# Patient Record
Sex: FEMALE | Race: WHITE | Employment: STUDENT | URBAN - METROPOLITAN AREA
[De-identification: names, ages, dates, MRNs, and addresses within clinical notes are randomized per-mention and may not be internally consistent; named-entity substitution may affect disease eponyms.]

---
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
## 2024-03-07 DIAGNOSIS — Z30.09 COUNSELING FOR BIRTH CONTROL, ORAL CONTRACEPTIVES: ICD-10-CM

## 2024-03-07 NOTE — TELEPHONE ENCOUNTER
Gynecology Medication Protocol Aspcjq1503/07/2024 12:35 AM    PASS-PENDING LAST PAP WNL--VIA MANUAL LOOKUP    Physical or Pelvic/Breast in past 12 or next 3 mos--VIA MANUAL LOOKUP   Routing to provider per protocol.   Levonorgestrel-Ethinyl Estrad 0.1-20 MG-MCG Oral Tab   Last refilled on 12/26/23 for #84  with 3 rf.   Last labs 12/26/23.   Last seen on 12/26/23.     No future appointments.       Thank you.     
Fair

## 2024-03-08 RX ORDER — LEVONORGESTREL AND ETHINYL ESTRADIOL 0.1-0.02MG
1 KIT ORAL DAILY
Qty: 84 TABLET | Refills: 3 | Status: SHIPPED | OUTPATIENT
Start: 2024-03-08

## 2024-06-22 DIAGNOSIS — F41.9 ANXIETY: ICD-10-CM

## 2024-06-24 NOTE — TELEPHONE ENCOUNTER
No refill protocol for this medication.    Last refill: 12/26/2023 #90 with 3 refills  Last Visit: 12/26/2023   Next Visit: No future appointments.      Forward to GERARDO Solares please advise on refills. Thanks.

## 2024-06-25 ENCOUNTER — PATIENT MESSAGE (OUTPATIENT)
Dept: FAMILY MEDICINE CLINIC | Facility: CLINIC | Age: 19
End: 2024-06-25

## 2024-06-25 NOTE — TELEPHONE ENCOUNTER
HISTORY OF PRESENT ILLNESS:  Jad Davila is a 32 year old male who comes in today complaining of nausea that started 3 days ago and need a work excuse.     Symptoms are improving.  Associated symptoms include: abdominal pain, nausea, and diarrhea. Denies severe ABD (abdominal) pain, hematemesis, melena, and hematochezia. Voiding usual amount. Denies dark urine. For symptom relief Jad Davila has tried over-the-counter TUMs, Pepto bismol, OTC headache pills. Family/Social History: There has not been any recent exposure to individuals with similar symptoms. Reports ingestion of suspicious food/drink - [employer]. No recent foreign travel. He has been drinking.     No outpatient medications have been marked as taking for the 12/18/19 encounter (Walk In) with Adeline Hale NP.     ALLERGIES:  No Known Allergies     reports that he quit smoking about 5 years ago. His smoking use included cigarettes. He smoked 1.00 pack per day. He has quit using smokeless tobacco.     REVIEW OF SYSTEMS:  A three point review of systems was performed.  All other systems were reviewed and are negative unless stated above.    PHYSICAL EXAMINATION:  Visit Vitals  /76   Pulse 87   Temp 98.6 °F (37 °C)   Resp 16   SpO2 97%   General:  Healthy, alert, in no distress, cooperative.  Neck: Supple. Cervical lymph nodes: no enlargement  Eyes:  Conjunctivae/sclerae normal. No erythema, edema or exudate.  Nose:  nares patent  Mouth/Throat: Mucous membranes pink and moist.   Heart:  regular rate and rhythm and normal S1 and S2  Lungs: Respirations unlabored.  Chest expansion equal. Lungs clear to ausculation.  No rhonchi, wheezing, or rales.   Skin: Good turgor. No rashes.   GI: soft, nontender, without masses or organomegally with normal active bowel sounds    ASSESSMENT/PLAN:  1. Acute gastroenteritis  Symptom Management Recommended: Frequent, sips clear fluids - electrolyte drink like Gatorade or Pedialyte  Spoke with patient, advised 1 year supply sent on 12/26/23. Patient verbally understood     advised. Advance diet as tolerated. BRAT (Banana/Rice/Apple/Tea) diet. Tylenol as directed on package for fever, pain and body aches. Present to UC/ED (Urgent Care/Emergency Department) if any signs of dehydration.     Patient education materials given. Present to ED if symptoms worsen or high fever. Patient to follow up with primary care provider as needed.     Jad verbalizes understanding and is in agreement and has no additional concerns/questions at this time.     Adeline Hale, NP

## 2024-06-25 NOTE — TELEPHONE ENCOUNTER
From: Kelsie Watson  To: Luciana Cervantes  Sent: 6/25/2024 4:42 PM CDT  Subject: Zoloft     Hi, my request was denied for a refill of Sertraline and I’m not sure why. If you could provide an explanation that would be greatly appreciated.     Thank you,  Kelsie

## 2024-07-02 ENCOUNTER — TELEPHONE (OUTPATIENT)
Dept: FAMILY MEDICINE CLINIC | Facility: CLINIC | Age: 19
End: 2024-07-02

## 2024-07-02 NOTE — TELEPHONE ENCOUNTER
PT HAS SCHEDULED MYCHART APT FOR:    Trouble breathing     Future Appointments   Date Time Provider Department Center   7/2/2024  1:40 PM Luciana Cervantes APRN [facility] [place]     PLEASE TRIAGE     THANK YOU

## 2024-07-02 NOTE — TELEPHONE ENCOUNTER
Patient states that she has been having cough and difficutly taking a deep breath since Sunday.    She states she took a home COVID test and it is negative.    Patient denies fever, sore throat.    Patient is coming in this afternoon for visit    Future Appointments   Date Time Provider Department Center   7/2/2024  1:40 PM Luciana Cervantes APRN [facility] [place]

## 2024-07-03 ENCOUNTER — OFFICE VISIT (OUTPATIENT)
Dept: FAMILY MEDICINE CLINIC | Facility: CLINIC | Age: 19
End: 2024-07-03
Payer: COMMERCIAL

## 2024-07-03 VITALS
HEART RATE: 98 BPM | WEIGHT: 170 LBS | TEMPERATURE: 98 F | DIASTOLIC BLOOD PRESSURE: 70 MMHG | RESPIRATION RATE: 16 BRPM | OXYGEN SATURATION: 98 % | SYSTOLIC BLOOD PRESSURE: 100 MMHG | BODY MASS INDEX: 27 KG/M2

## 2024-07-03 DIAGNOSIS — R00.2 INTERMITTENT PALPITATIONS: Primary | ICD-10-CM

## 2024-07-03 DIAGNOSIS — R05.1 ACUTE COUGH: ICD-10-CM

## 2024-07-03 DIAGNOSIS — R09.89 CHEST CONGESTION: ICD-10-CM

## 2024-07-03 LAB
ATRIAL RATE: 65 BPM
P AXIS: 39 DEGREES
P-R INTERVAL: 134 MS
Q-T INTERVAL: 378 MS
QRS DURATION: 88 MS
QTC CALCULATION (BEZET): 393 MS
R AXIS: 34 DEGREES
T AXIS: 41 DEGREES
VENTRICULAR RATE: 65 BPM

## 2024-07-03 PROCEDURE — 99214 OFFICE O/P EST MOD 30 MIN: CPT | Performed by: NURSE PRACTITIONER

## 2024-07-03 PROCEDURE — 93000 ELECTROCARDIOGRAM COMPLETE: CPT | Performed by: NURSE PRACTITIONER

## 2024-07-03 PROCEDURE — 3078F DIAST BP <80 MM HG: CPT | Performed by: NURSE PRACTITIONER

## 2024-07-03 PROCEDURE — 3074F SYST BP LT 130 MM HG: CPT | Performed by: NURSE PRACTITIONER

## 2024-07-03 RX ORDER — ALBUTEROL SULFATE 90 UG/1
1-2 AEROSOL, METERED RESPIRATORY (INHALATION) EVERY 4 HOURS PRN
Qty: 1 EACH | Refills: 0 | Status: SHIPPED | OUTPATIENT
Start: 2024-07-03 | End: 2024-07-17

## 2024-07-03 RX ORDER — AZITHROMYCIN 250 MG/1
TABLET, FILM COATED ORAL
Qty: 6 TABLET | Refills: 0 | Status: SHIPPED | OUTPATIENT
Start: 2024-07-03 | End: 2024-07-07

## 2024-07-03 NOTE — PROGRESS NOTES
CHIEF COMPLAINT:    Chief Complaint   Patient presents with    Breathing Problem       HISTORY OF PRESENT ILLNESS:    Kelsie who has a history of SVT presents today, July 03, 2024, for 4 day history of \"difficulty breathing,\" chest tightness, productive cough, scratchy sore throat, and chest pain to middle of chest.  Made worse with coughing.  Made better with use of dayquil.  Positive for intermittent palpitations, lasting about 30 seconds or less.  Denies fever, night sweats, near fainting, wheezing, or chills.    ALLERGIES:  No Known Allergies    CURRENT MEDICATIONS:  Current Outpatient Medications   Medication Sig Dispense Refill    azithromycin 250 MG Oral Tab Take 2 tablets (500 mg total) by mouth daily for 1 day, THEN 1 tablet (250 mg total) daily for 4 days. 6 tablet 0    albuterol 108 (90 Base) MCG/ACT Inhalation Aero Soln Inhale 1-2 puffs into the lungs every 4 (four) hours as needed for Wheezing or Shortness of Breath. 1 each 0    Levonorgestrel-Ethinyl Estrad 0.1-20 MG-MCG Oral Tab Take 1 tablet by mouth daily. Due for physical before next refill 84 tablet 3    sertraline (ZOLOFT) 50 MG Oral Tab Take 1 tablet (50 mg total) by mouth daily. 90 tablet 3    metoclopramide 10 MG Oral Tab 1 tab with the first dose of migraine medication 20 tablet 0    Naratriptan HCl 2.5 MG Oral Tab Use at onset; may repeat once after 4 hours- ONLY 2 IN 24 HOUR PERIOD MAX. This is a 30 day supply. 8 tablet 5       MEDICAL HISTORY:  Past Medical History:    Arrhythmia    SVT - had ablation 2010    Hypothyroidism    SVT (supraventricular tachycardia) (HCC)     Past Surgical History:   Procedure Laterality Date    Ablation      Other surgical history  8/18/10    ablation for SVT     Family History   Problem Relation Age of Onset    Migraines Father     Other (Positive for Brca 2) Mother     Cancer Maternal Grandmother         breast    Prostate Cancer Maternal Grandfather     Heart Disease Neg     Stroke Neg      Family Status    Relation Status    Fa Alive    Mo Alive    PGMA Alive    PGFA Alive    MGMA Alive    MGFA Alive    NEG (Not Specified)     Social History     Socioeconomic History    Marital status: Single   Tobacco Use    Smoking status: Never     Passive exposure: Never   Vaping Use    Vaping status: Never Used   Substance and Sexual Activity    Alcohol use: No    Drug use: No   Other Topics Concern    Caffeine Concern Yes     Comment: a few sodas a week    Exercise Yes     Comment: walks       ROS:  GENERAL:  Denies recorded temperatures greater than 100.5F  RESPIRATORY:  +hpi  CARDIAC:  Denies chest pain with exertion    VITALS:   /70   Pulse 98   Temp 97.5 °F (36.4 °C)   Resp 16   Wt 170 lb (77.1 kg)   LMP 12/19/2023 (Approximate)   SpO2 98%   BMI 27.44 kg/m²     Reviewed by Luciana Cervantes MS, APRN, FNP-BC    PHYSICAL EXAM:    Constitutional:       Appears well.  Sitting upright on exam table.  Well developed, well nourished, and in no acute distress  HEENT:      Facial features symmetric. Normocephalic and atraumatic  Cardiovascular:      Heart sounds: Regular rate and rhythm without murmur     No edema of BLE  Pulmonary:      Chest expansion symmetric.  Breathing nonlabored. Lungs coarse to upper lobe throughout     No cough.  Musculoskeletal:         Movements smooth and controlled with appropriate coordination.       Gait is steady, nonantalgic.  Neuro:       No focal deficits, cranial nerves grossly intact.       Movements smooth and controlled, appropriate coordination without ataxia or tremors.  Skin:     Warm and dry without jaundice or rashes.  Psychiatric:         Alert and oriented.  Calm and cooperative.  Speech is clear.     ASSESSMENT & PLAN:    1. Intermittent palpitations  Reinforced hydration  - EKG with interpretation and Report -IN OFFICE [21822]    2. Chest congestion  - azithromycin 250 MG Oral Tab; Take 2 tablets (500 mg total) by mouth daily for 1 day, THEN 1 tablet (250 mg total) daily  for 4 days.  Dispense: 6 tablet; Refill: 0  - albuterol 108 (90 Base) MCG/ACT Inhalation Aero Soln; Inhale 1-2 puffs into the lungs every 4 (four) hours as needed for Wheezing or Shortness of Breath.  Dispense: 1 each; Refill: 0    3. Acute cough  - albuterol 108 (90 Base) MCG/ACT Inhalation Aero Soln; Inhale 1-2 puffs into the lungs every 4 (four) hours as needed for Wheezing or Shortness of Breath.  Dispense: 1 each; Refill: 0    Follow-up 1 week if failure to improve  Earlier if new signs or symptoms

## 2024-12-20 ENCOUNTER — OFFICE VISIT (OUTPATIENT)
Dept: FAMILY MEDICINE CLINIC | Facility: CLINIC | Age: 19
End: 2024-12-20
Payer: COMMERCIAL

## 2024-12-20 ENCOUNTER — HOSPITAL ENCOUNTER (OUTPATIENT)
Dept: GENERAL RADIOLOGY | Age: 19
Discharge: HOME | End: 2024-12-20
Attending: NURSE PRACTITIONER
Payer: COMMERCIAL

## 2024-12-20 VITALS
BODY MASS INDEX: 27.97 KG/M2 | HEART RATE: 82 BPM | HEIGHT: 66 IN | RESPIRATION RATE: 16 BRPM | DIASTOLIC BLOOD PRESSURE: 68 MMHG | TEMPERATURE: 98 F | OXYGEN SATURATION: 99 % | WEIGHT: 174 LBS | SYSTOLIC BLOOD PRESSURE: 100 MMHG

## 2024-12-20 DIAGNOSIS — F41.9 ANXIETY: ICD-10-CM

## 2024-12-20 DIAGNOSIS — G89.29 CHRONIC MIDLINE LOW BACK PAIN WITHOUT SCIATICA: ICD-10-CM

## 2024-12-20 DIAGNOSIS — M54.50 CHRONIC MIDLINE LOW BACK PAIN WITHOUT SCIATICA: ICD-10-CM

## 2024-12-20 DIAGNOSIS — M76.62 ACHILLES TENDINITIS OF BOTH LOWER EXTREMITIES: ICD-10-CM

## 2024-12-20 DIAGNOSIS — I47.10 SVT (SUPRAVENTRICULAR TACHYCARDIA) (HCC): ICD-10-CM

## 2024-12-20 DIAGNOSIS — R20.2 PARESTHESIA: ICD-10-CM

## 2024-12-20 DIAGNOSIS — G43.009 MIGRAINE WITHOUT AURA AND WITHOUT STATUS MIGRAINOSUS, NOT INTRACTABLE: ICD-10-CM

## 2024-12-20 DIAGNOSIS — M76.61 ACHILLES TENDINITIS OF BOTH LOWER EXTREMITIES: ICD-10-CM

## 2024-12-20 DIAGNOSIS — Z00.00 ANNUAL PHYSICAL EXAM: Primary | ICD-10-CM

## 2024-12-20 LAB
ALBUMIN SERPL-MCNC: 4.5 G/DL (ref 3.2–4.8)
ALBUMIN/GLOB SERPL: 1.6 {RATIO} (ref 1–2)
ALP LIVER SERPL-CCNC: 88 U/L
ALT SERPL-CCNC: 15 U/L
ANION GAP SERPL CALC-SCNC: 11 MMOL/L (ref 0–18)
AST SERPL-CCNC: 19 U/L (ref ?–34)
BASOPHILS # BLD AUTO: 0.04 X10(3) UL (ref 0–0.2)
BASOPHILS NFR BLD AUTO: 0.6 %
BILIRUB SERPL-MCNC: 0.4 MG/DL (ref 0.3–1.2)
BUN BLD-MCNC: 13 MG/DL (ref 9–23)
CALCIUM BLD-MCNC: 10 MG/DL (ref 8.7–10.4)
CHLORIDE SERPL-SCNC: 106 MMOL/L (ref 98–112)
CHOLEST SERPL-MCNC: 193 MG/DL (ref ?–200)
CO2 SERPL-SCNC: 25 MMOL/L (ref 21–32)
CREAT BLD-MCNC: 0.95 MG/DL
DEPRECATED HBV CORE AB SER IA-ACNC: 11 NG/ML
EGFRCR SERPLBLD CKD-EPI 2021: 88 ML/MIN/1.73M2 (ref 60–?)
EOSINOPHIL # BLD AUTO: 0.09 X10(3) UL (ref 0–0.7)
EOSINOPHIL NFR BLD AUTO: 1.4 %
ERYTHROCYTE [DISTWIDTH] IN BLOOD BY AUTOMATED COUNT: 12.3 %
FASTING PATIENT LIPID ANSWER: NO
FASTING STATUS PATIENT QL REPORTED: NO
GLOBULIN PLAS-MCNC: 2.8 G/DL (ref 2–3.5)
GLUCOSE BLD-MCNC: 85 MG/DL (ref 70–99)
HCT VFR BLD AUTO: 44.3 %
HDLC SERPL-MCNC: 58 MG/DL (ref 40–59)
HGB BLD-MCNC: 15.4 G/DL
IMM GRANULOCYTES # BLD AUTO: 0.01 X10(3) UL (ref 0–1)
IMM GRANULOCYTES NFR BLD: 0.2 %
IRON SATN MFR SERPL: 20 %
IRON SERPL-MCNC: 78 UG/DL
LDLC SERPL CALC-MCNC: 121 MG/DL (ref ?–100)
LYMPHOCYTES # BLD AUTO: 1.95 X10(3) UL (ref 1–4)
LYMPHOCYTES NFR BLD AUTO: 30.6 %
MCH RBC QN AUTO: 30.3 PG (ref 26–34)
MCHC RBC AUTO-ENTMCNC: 34.8 G/DL (ref 31–37)
MCV RBC AUTO: 87.2 FL
MONOCYTES # BLD AUTO: 0.48 X10(3) UL (ref 0.1–1)
MONOCYTES NFR BLD AUTO: 7.5 %
NEUTROPHILS # BLD AUTO: 3.8 X10 (3) UL (ref 1.5–7.7)
NEUTROPHILS # BLD AUTO: 3.8 X10(3) UL (ref 1.5–7.7)
NEUTROPHILS NFR BLD AUTO: 59.7 %
NONHDLC SERPL-MCNC: 135 MG/DL (ref ?–130)
OSMOLALITY SERPL CALC.SUM OF ELEC: 293 MOSM/KG (ref 275–295)
PLATELET # BLD AUTO: 291 10(3)UL (ref 150–450)
POTASSIUM SERPL-SCNC: 4.1 MMOL/L (ref 3.5–5.1)
PROT SERPL-MCNC: 7.3 G/DL (ref 5.7–8.2)
RBC # BLD AUTO: 5.08 X10(6)UL
SODIUM SERPL-SCNC: 142 MMOL/L (ref 136–145)
T4 FREE SERPL-MCNC: 1.2 NG/DL (ref 0.8–1.7)
TOTAL IRON BINDING CAPACITY: 385 UG/DL (ref 250–425)
TRANSFERRIN SERPL-MCNC: 307 MG/DL (ref 250–380)
TRIGL SERPL-MCNC: 77 MG/DL (ref 30–149)
TSI SER-ACNC: 7.16 UIU/ML (ref 0.55–4.78)
VIT B12 SERPL-MCNC: 465 PG/ML (ref 211–911)
VLDLC SERPL CALC-MCNC: 13 MG/DL (ref 0–30)
WBC # BLD AUTO: 6.4 X10(3) UL (ref 4–11)

## 2024-12-20 PROCEDURE — 80061 LIPID PANEL: CPT | Performed by: NURSE PRACTITIONER

## 2024-12-20 PROCEDURE — 72110 X-RAY EXAM L-2 SPINE 4/>VWS: CPT | Performed by: NURSE PRACTITIONER

## 2024-12-20 PROCEDURE — 99395 PREV VISIT EST AGE 18-39: CPT | Performed by: NURSE PRACTITIONER

## 2024-12-20 PROCEDURE — 83540 ASSAY OF IRON: CPT | Performed by: NURSE PRACTITIONER

## 2024-12-20 PROCEDURE — 3008F BODY MASS INDEX DOCD: CPT | Performed by: NURSE PRACTITIONER

## 2024-12-20 PROCEDURE — 84443 ASSAY THYROID STIM HORMONE: CPT | Performed by: NURSE PRACTITIONER

## 2024-12-20 PROCEDURE — 3074F SYST BP LT 130 MM HG: CPT | Performed by: NURSE PRACTITIONER

## 2024-12-20 PROCEDURE — 83550 IRON BINDING TEST: CPT | Performed by: NURSE PRACTITIONER

## 2024-12-20 PROCEDURE — 84439 ASSAY OF FREE THYROXINE: CPT | Performed by: NURSE PRACTITIONER

## 2024-12-20 PROCEDURE — 3078F DIAST BP <80 MM HG: CPT | Performed by: NURSE PRACTITIONER

## 2024-12-20 PROCEDURE — 82728 ASSAY OF FERRITIN: CPT | Performed by: NURSE PRACTITIONER

## 2024-12-20 PROCEDURE — 85025 COMPLETE CBC W/AUTO DIFF WBC: CPT | Performed by: NURSE PRACTITIONER

## 2024-12-20 PROCEDURE — 80053 COMPREHEN METABOLIC PANEL: CPT | Performed by: NURSE PRACTITIONER

## 2024-12-20 PROCEDURE — 82607 VITAMIN B-12: CPT | Performed by: NURSE PRACTITIONER

## 2024-12-20 NOTE — PROGRESS NOTES
CHIEF COMPLAINT:    Chief Complaint   Patient presents with    Physical     Reviewed Preventative/Wellness form with patient.         HISTORY OF PRESENT ILLNESS:    Kelsie Watson is a 19 year old female who has a history of SVT, anxiety, and achilles tendinitis of BLE presents today, December 20, 2024, for an annual physical and to address two concerns:  Migraines and lower back pain    - NUTRITION:  Follows a regular diet.  Drinks approximately 64oz of water a day.   - SLEEP:  Sleeps throughout the night.  Wakes up feeling rested.    - SAFETY:  Reports feeling safe at home.  Anxiety borderline, stopped taking sertraline, last dose October 2024.   - PHYSICAL ACTIVITY/SOCIAL/HOBBIES:  Achieves 2.5hr of exercise a week.  Denies chest pain or palpitations with activity.   - GOAL:  Kelsie would like to improve health overall by addressing two recurring concerns:  migraines and lower back pain    IMMUNIZATIONS:  Declines today    Migraines  Began about a year and a half ago  Has previously seen neurology team, who diagnosed with ocular migraines  Experiences numbness/tingling on one side of face as well as bilateral hands  Pain is described as sharp and annoying, then throbbing  Last episode about 1 month ago  Lasting about 8-12 hours  Managing with ibuprofen and adequate hydration, dark room helps  Positive for photophobia and bilateral eye pain, eye pain typically occurring after migraine  Denies nausea or vomiting  Last eye exam over a year ago, agreeable to complete eye exam while home/back from [facility]  Previously taking naratriptan   Last neuro exam with Dr. Glez, requesting referral today    SVT s/p RF ablation on 09/19/2010  Concealed left posteroseptal AP  Previously under the care of Dr. Yves Peguero who considered event monitor if symptoms recur per notes from 07/13/2022  Kelsie reports having a \"hole in heart\" and would like to follow with cardiology for follow-up  Has appointment set up with  Dr. Velazquez  Requesting referral     Back pain  Lower, midline  Made worse with laying supine on floor \"takes the wind out of me\"  Began in high school  Lower back pain has worsened in the past month  Denies difficulty going up and down stairs or pain with running  Denies leg weakness, numbness/tingling of BLE, or difficulty with urination/bowel movements  Denies muscle spasms or previous injury  We discussed possibility of PT, injections, and/or referral depending on results of xray    Patient Active Problem List   Diagnosis    SVT (supraventricular tachycardia) (HCC)    Achilles tendinitis of both lower extremities    Anxiety    Migraine without aura and without status migrainosus, not intractable    Chronic midline low back pain without sciatica        ALLERGIES:  Allergies[1]    CURRENT MEDICATIONS:  Current Outpatient Medications   Medication Sig Dispense Refill    Levonorgestrel-Ethinyl Estrad 0.1-20 MG-MCG Oral Tab Take 1 tablet by mouth daily. Due for physical before next refill 84 tablet 3    sertraline (ZOLOFT) 50 MG Oral Tab Take 1 tablet (50 mg total) by mouth daily. 90 tablet 3    metoclopramide 10 MG Oral Tab 1 tab with the first dose of migraine medication 20 tablet 0    Naratriptan HCl 2.5 MG Oral Tab Use at onset; may repeat once after 4 hours- ONLY 2 IN 24 HOUR PERIOD MAX. This is a 30 day supply. 8 tablet 5       MEDICAL HISTORY:  Past Medical History:    Anxiety    Arrhythmia    SVT - had ablation 2010    Calcaneal apophysitis    Hypothyroidism    SVT (supraventricular tachycardia) (HCC)     Past Surgical History:   Procedure Laterality Date    Ablation      Other surgical history  8/18/10    ablation for SVT     Family History   Problem Relation Age of Onset    Migraines Father     Other (Positive for Brca 2) Mother     Anemia Mother     Cancer Mother         Skin Cancer    Cancer Paternal Grandfather     Cancer Maternal Grandmother         Breast Cancer    Prostate Cancer Maternal Grandfather      Asthma Brother     Depression Brother     Heart Disease Neg     Stroke Neg      Family Status   Relation Status    Fa Alive    Mo Alive    PGMA Alive    PGFA Alive    MGMA Alive    MGFA Alive    Bro (Not Specified)    Bro (Not Specified)    NEG (Not Specified)     Social History     Socioeconomic History    Marital status: Single   Tobacco Use    Smoking status: Never     Passive exposure: Never    Smokeless tobacco: Never   Vaping Use    Vaping status: Never Used   Substance and Sexual Activity    Alcohol use: No    Drug use: No   Other Topics Concern    Caffeine Concern No    Stress Concern Yes    Weight Concern No    Special Diet No    Exercise No    Seat Belt No       ROS:    GENERAL:  Denies fever   RESPIRATORY:  Denies difficulty breathing  CARDIAC:  Denies chest pain with exertion  GI:  Denies nausea, vomiting, diarrhea, constipation, or blood in stool  :  Denies blood in urine or painful urination  NEURO:  +migraines  MSKL:  +lower back pain  SKIN:  Denies rashes  PSYCH: Denies thoughts of self harm or harming others     VITALS:    /68   Pulse 82   Temp 97.6 °F (36.4 °C) (Temporal)   Resp 16   Ht 5' 6\" (1.676 m)   Wt 174 lb (78.9 kg)   LMP 12/17/2024 (Approximate)   SpO2 99%   BMI 28.08 kg/m²     Ideal body weight: 59.3 kg (130 lb 11.7 oz)  Adjusted ideal body weight: 67.1 kg (148 lb 0.6 oz)  Wt Readings from Last 3 Encounters:   12/20/24 174 lb (78.9 kg)   07/03/24 170 lb (77.1 kg)   12/26/23 165 lb (74.8 kg) (90%, Z= 1.27)*     * Growth percentiles are based on CDC (Girls, 2-20 Years) data.     BP Readings from Last 3 Encounters:   12/20/24 100/68   07/03/24 100/70   12/26/23 110/60     Reviewed by Luciana Cervantes MS, APRN, FNP-BC    PHYSICAL EXAM:    Constitutional:       Appearance: Normal appearance.  Sitting upright on exam table.  Well developed, well nourished, and in no acute distress.  HENT:      Head: Facial features symmetric. Normocephalic and atraumatic.      Right Ear:  Canal clear without erythema or drainage.  TM clear and intact, neutral in position.      Left Ear: Canal clear without erythema or drainage.  TM clear and intact, neutral in position.      Nose: Nose normal.      Mouth/Throat: Mucous membranes are moist.  Uvula rises midline.  Eyes:      Extraocular Movements: Extraocular movements intact.      Conjunctiva/sclera: Conjunctivae normal. Sclera anicteric         Pupils: Pupils are equal, round, and reactive to light.   Neck:     Neck is supple. Trachea is midline.  No masses.   Cardiovascular:      Heart sounds: Regular rate and rhythm without murmur.     BLE without edema.  Pulmonary:      Effort: Pulmonary effort is normal.      Breath sounds: Lungs clear throughout.     No cough or wheezing.  Abdominal:      General: Abdomen is nondistended, bowel sounds normoactive, soft, nontender.  No organomegaly.  Musculoskeletal:         General: Normal range of motion. Strength of extremities are equal bilaterally.     Range of motion without limitations.  Skin:     General: Skin is warm and dry.      Coloration: Skin is without jaundice     Findings: No bruising or rashes  Neurological:      General: No focal deficit present. Speech is clear and organized.     Mental Status: Alert and oriented to person, place, and time.      Sensory: Sensation is intact.      Motor: Motor function is intact. Movements are smooth and controlled without ataxia.     Coordination: Coordination is intact. Coordination normal.      Gait: Gait steady and nonantalgic.      Deep Tendon Reflexes: Reflexes 2+ bilaterally.     Cranial nerves II-XII intact  Psychiatric:         Mood and Affect: Mood normal.         Behavior: Behavior normal.         Thought Content: Thought content normal.         Judgment: Judgment normal.     ASSESSMENT & PLAN:  Plan on follow-up with neurology and cardiology   ER if signs/symptoms as discussed  Awaiting lab results to guide plan of care    1. Annual physical exam  +  migraines  + lower back pain  - CBC With Differential With Platelet; Future  - TSH and Free T4; Future  - Comp Metabolic Panel (14); Future  - Lipid Panel; Future  - Ferritin; Future  - Iron And Tibc; Future  - VENIPUNCTURE  - CBC With Differential With Platelet  - TSH and Free T4  - Comp Metabolic Panel (14)  - Lipid Panel  - Ferritin  - Iron And Tibc    2. SVT (supraventricular tachycardia) (HCC)  Migraines  Denies palpitations, chest pain, or activity intolerance  Referral to cardiology to establish care  Last appointment with pediatric cardiologist, has aged out  - CBC With Differential With Platelet; Future  - Comp Metabolic Panel (14); Future  - Ferritin; Future  - Iron And Tibc; Future  - Cardio Referral - Internal  - CBC With Differential With Platelet  - Comp Metabolic Panel (14)  - Ferritin  - Iron And Tibc    3. Achilles tendinitis of both lower extremities  Asymptomatic    4. Anxiety  Stable  Discussed various treatment options  Awaiting labs  Consider follow-up to discuss more in depth if labs normal  - CBC With Differential With Platelet; Future  - TSH and Free T4; Future  - Ferritin; Future  - Iron And Tibc; Future  - CBC With Differential With Platelet  - TSH and Free T4  - Ferritin  - Iron And Tibc    5. Migraine without aura and without status migrainosus, not intractable  Referral to neurology team  In mean time, assessing for possible iron/vitamin deficiency as well as thyroid disease, electrolyte disturbance  - CBC With Differential With Platelet; Future  - TSH and Free T4; Future  - Comp Metabolic Panel (14); Future  - Ferritin; Future  - Iron And Tibc; Future  - Vitamin B12 [E]; Future  - Neuro Referral - In Network  - Cardio Referral - Internal  - CBC With Differential With Platelet  - TSH and Free T4  - Comp Metabolic Panel (14)  - Ferritin  - Iron And Tibc  - Vitamin B12 [E]    6. Paresthesia  Referral to neurology team  In mean time, assessing for possible iron/vitamin deficiency as well  as thyroid disease, electrolyte disturbance  - Vitamin B12 [E]; Future  - Vitamin B12 [E]    7. Chronic midline low back pain without sciatica  Midline  Made worse with laying supine on floor \"takes the wind out of me\"  - XR LUMBAR SPINE (MIN 4 VIEWS) (CPT=72110); Future         [1] No Known Allergies

## 2024-12-24 ENCOUNTER — PATIENT MESSAGE (OUTPATIENT)
Dept: FAMILY MEDICINE CLINIC | Facility: CLINIC | Age: 19
End: 2024-12-24

## 2024-12-24 DIAGNOSIS — R79.89 ELEVATED TSH: Primary | ICD-10-CM

## 2025-01-07 RX ORDER — METOCLOPRAMIDE 10 MG/1
TABLET ORAL
Qty: 20 TABLET | Refills: 0 | Status: SHIPPED | OUTPATIENT
Start: 2025-01-07

## 2025-01-07 RX ORDER — NARATRIPTAN 2.5 MG/1
TABLET ORAL
Qty: 8 TABLET | Refills: 0 | Status: SHIPPED | OUTPATIENT
Start: 2025-01-07

## 2025-01-07 NOTE — TELEPHONE ENCOUNTER
Medication: Naratriptan 2.5 mg & Metoclopramide 10 mg     Date of last refill: 2023 with 5 addt refills  [place]      Last office visit: 2023  Due back to clinic per last office note:    Date next office visit scheduled:    Future Appointments   Date Time Provider Department Center   2025  3:15 PM [facility]           Last OV note recommendation:    PLAN:  Diagnostic:    Given that this is her first episode we are compelled to do advanced imaging with MRI of the brain as well as MRA of the carotid vessels.           Treatments:  Orders Placed This Encounter      metoclopramide 10 MG Oral Tab          Si tab with the first dose of migraine medication          Dispense:  20 tablet          Refill:  0      Naratriptan HCl 2.5 MG Oral Tab          Sig: Use at onset; may repeat once after 4 hours- ONLY 2 IN 24 HOUR PERIOD MAX. This is a 30 day supply.          Dispense:  8 tablet          Refill:  5

## 2025-01-08 ENCOUNTER — HOSPITAL ENCOUNTER (OUTPATIENT)
Dept: CV DIAGNOSTICS | Facility: HOSPITAL | Age: 20
Discharge: HOME | End: 2025-01-08
Attending: INTERNAL MEDICINE
Payer: COMMERCIAL

## 2025-01-08 DIAGNOSIS — I47.10 SVT (SUPRAVENTRICULAR TACHYCARDIA) (HCC): ICD-10-CM

## 2025-01-08 DIAGNOSIS — R00.2 PALPITATIONS: ICD-10-CM

## 2025-01-08 PROCEDURE — 93306 TTE W/DOPPLER COMPLETE: CPT | Performed by: INTERNAL MEDICINE

## 2025-02-25 ENCOUNTER — TELEPHONE (OUTPATIENT)
Dept: FAMILY MEDICINE CLINIC | Facility: CLINIC | Age: 20
End: 2025-02-25

## 2025-02-25 NOTE — TELEPHONE ENCOUNTER
Patient due for labs. [facility] sent to patient.   Future Appointments   Date Time Provider Department Center   3/12/2025  1:00 PM Harriett Rendon PA [facility] [place]

## 2025-03-12 ENCOUNTER — TELEPHONE (OUTPATIENT)
Dept: NEUROLOGY | Facility: CLINIC | Age: 20
End: 2025-03-12

## 2025-03-12 ENCOUNTER — OFFICE VISIT (OUTPATIENT)
Dept: NEUROLOGY | Facility: CLINIC | Age: 20
End: 2025-03-12
Payer: COMMERCIAL

## 2025-03-12 VITALS
RESPIRATION RATE: 16 BRPM | BODY MASS INDEX: 28.13 KG/M2 | WEIGHT: 175 LBS | HEIGHT: 66 IN | SYSTOLIC BLOOD PRESSURE: 107 MMHG | HEART RATE: 75 BPM | DIASTOLIC BLOOD PRESSURE: 59 MMHG

## 2025-03-12 DIAGNOSIS — G43.009 MIGRAINE WITHOUT AURA AND WITHOUT STATUS MIGRAINOSUS, NOT INTRACTABLE: Primary | ICD-10-CM

## 2025-03-12 PROCEDURE — 3008F BODY MASS INDEX DOCD: CPT | Performed by: PHYSICIAN ASSISTANT

## 2025-03-12 PROCEDURE — 3078F DIAST BP <80 MM HG: CPT | Performed by: PHYSICIAN ASSISTANT

## 2025-03-12 PROCEDURE — 3074F SYST BP LT 130 MM HG: CPT | Performed by: PHYSICIAN ASSISTANT

## 2025-03-12 PROCEDURE — 99213 OFFICE O/P EST LOW 20 MIN: CPT | Performed by: PHYSICIAN ASSISTANT

## 2025-03-12 RX ORDER — UBROGEPANT 100 MG/1
TABLET ORAL
Qty: 3 TABLET | Refills: 0 | Status: SHIPPED | COMMUNITY
Start: 2025-03-12 | End: 2026-03-12

## 2025-03-12 NOTE — PROGRESS NOTES
NEUROLOGY  [facility]       Previous visit and existing record notes reviewed in preparation for the face to face visit.  Relevant labs and studies reviewed and will be noted in relevant areas of this record.    Kelsie Hoang Walter  3/24/2005  Primary Care Provider:  Brian Conde DO    3/12/2025  19 year old female,      was last seen on: Patient was last seen 3/7/23 for first visit at that time with complaints of transient neurologic symptoms associated with migraine headache. At that time MRI Brain and MRA Neck were ordered and she was given amerge for abortive tx    Seen for/plans last visit:  Migraines    Accompanied visit: Yes- Mother    Present condition:  The migraines are currently occurring once every 2 months.   Patient states that her symptoms will start with a vision change. It can be blurry or black spot in her vision. When she notices the vision change is when she will take medication for the migraine. Then she will get numbness in her fingers and into her face, lips,cheeks and can feel it in her legs. Most of the time it is located on one side of the body. Once the paresthesias resolve then the headache will come.  The headache pain It is bitemporal and describes a sharp pain The headache can last 8-16 hours  There is associated: +photophobia, no phonophobia, no nausea, no vomiting  No neck pain  Father has migraines  Uses the naratriptan but it has not been effective  She has tried tylenol and advil which does not work  She is not aware of any triggers for the migraines  They can occur anytime of day    MRI Brain(3/8/23)        Impression   CONCLUSION:       1. No acute intracranial abnormality identified.     2. There are a few scattered punctate nonspecific foci of T2/FLAIR hyperintensity noted in the cerebral white matter. Possible etiologies for these findings would include migraine headaches, postinfectious/postinflammatory changes, nonspecific gliosis,  and accelerated  microvascular ischemic disease amongst various other etiologies.  Clinical correlation is recommended.        MRA Neck(3/8/23)    Impression   CONCLUSION:  No evidence of occlusion, dissection, or flow-limiting stenosis in the cervical vertebral or carotid arteries. No evidence of hemodynamically significant carotid stenosis by NASCET criteria.       Review of Systems:  Review of Systems:  Denies systemic symptoms     No CP or SOB.  No GI or  symptoms. Relevant Neuro as noted above.    Past Medical History:    Anxiety    Arrhythmia    SVT - had ablation 2010    Calcaneal apophysitis    Hypothyroidism    SVT (supraventricular tachycardia) ([facility])       Medications:      Current Outpatient Medications:     ubrogepant (UBRELVY) 100 MG Oral Tab, Take one tablet at onset of migraine.  May take additional tablet in 2 hours if needed.  Do not exceed two tablets per 24 hour period., Disp: 3 tablet, Rfl: 0    metoclopramide 10 MG Oral Tab, 1 tab with the first dose of migraine medication, Disp: 20 tablet, Rfl: 0    Naratriptan HCl 2.5 MG Oral Tab, Use at onset; may repeat once after 4 hours- ONLY 2 IN 24 HOUR PERIOD MAX. This is a 30 day supply., Disp: 8 tablet, Rfl: 0    Levonorgestrel-Ethinyl Estrad 0.1-20 MG-MCG Oral Tab, Take 1 tablet by mouth daily. Due for physical before next refill, Disp: 84 tablet, Rfl: 3    sertraline (ZOLOFT) 50 MG Oral Tab, Take 1 tablet (50 mg total) by mouth daily., Disp: 90 tablet, Rfl: 3  PRN:     Allergies:  Allergies[1]       EXAM:  /59 (BP Location: Left arm, Patient Position: Sitting, Cuff Size: adult)   Pulse 75   Resp 16   Ht 66\"   Wt 175 lb (79.4 kg)   LMP 03/09/2025 (Approximate)   BMI 28.25 kg/m²   Looks stated age  General Exam:  HENT:  pink conjunctiva anicteric sclerae  Neck no adenopathy, thyroid normal  Heart and Lungs:  normal  Extremities: no cyanosis, skin changes    NEURO  NAD, A&Ox3  EOMI  CN II-XII intact  Strength 5/5 all extremities  DTRs 2+ and  symmetric  FTN intact bilaterally  No drift on exam  Normal gait  Tandem gait intact  Romberg negative    Problem/s Identified this visit:   1. Migraine without aura and without status migrainosus, not intractable          Discussion plus Diagnostics & Treatment Orders:    Migraines- Has not found the amerge to be effective. Patient given samples of ubrelvy to try for abortive tx. If effective was instructed to call for a prescription of the ubrelvy. She expressed full understanding.    (X) Discussed potential side effects of any treatment relevant to above.  Includes explanation of tests as necessary.    Return in about 4 months (around 7/12/2025).      Patient understands that if needed, based on condition and or test results, follow up will be readjusted    Harriett Rendon PA-C  [facility]  3/12/2025, Time completed 1:13 PM           [1] No Known Allergies

## 2025-03-12 NOTE — PATIENT INSTRUCTIONS
Refill policies:    Allow 2-3 business days for refills; controlled substances may take longer.  Contact your pharmacy at least 5 days prior to running out of medication and have them send an electronic request or submit request through the “request refill” option in your [employer] account.  Refills are not addressed on weekends; covering physicians do not authorize routine medications on weekends.  No narcotics or controlled substances are refilled after noon on Fridays or by on call physicians.  By law, narcotics must be electronically prescribed.  A 30 day supply with no refills is the maximum allowed.  If your prescription is due for a refill, you may be due for a follow up appointment.  To best provide you care, patients receiving routine medications need to be seen at least once a year.  Patients receiving narcotic/controlled substance medications need to be seen at least once every 3 months.  In the event that your preferred pharmacy does not have the requested medication in stock (e.g. Backordered), it is your responsibility to find another pharmacy that has the requested medication available.  We will gladly send a new prescription to that pharmacy at your request.    Scheduling Tests:    If your physician has ordered radiology tests such as MRI or CT scans, please contact Central Scheduling at [phone] right away to schedule the test.  Once scheduled, the [facility] Centralized Referral Team will work with your insurance carrier to obtain pre-certification or prior authorization.  Depending on your insurance carrier, approval may take 3-10 days.  It is highly recommended patients assure they have received an authorization before having a test performed.  If test is done without insurance authorization, patient may be responsible for the entire amount billed.      Precertification and Prior Authorizations:  If your physician has recommended that you have a procedure or additional testing performed the [facility]  Centralized Referral Team will contact your insurance carrier to obtain pre-certification or prior authorization.    You are strongly encouraged to contact your insurance carrier to verify that your procedure/test has been approved and is a COVERED benefit.  Although the [facility] Centralized Referral Team does its due diligence, the insurance carrier gives the disclaimer that \"Although the procedure is authorized, this does not guarantee payment.\"    Ultimately the patient is responsible for payment.   Thank you for your understanding in this matter.  Paperwork Completion:  If you require FMLA or disability paperwork for your recovery, please make sure to either drop it off or have it faxed to our office at [phone]. Be sure the form has your name and date of birth on it.  The form will be faxed to our Forms Department and they will complete it for you.  There is a 25$ fee for all forms that need to be filled out.  Please be aware there is a 10-14 day turnaround time.  You will need to sign a release of information (DEJUAN) form if your paperwork does not come with one.  You may call the Forms Department with any questions at [phone].  Their fax number is [phone].

## 2025-03-13 NOTE — TELEPHONE ENCOUNTER
Received fax from [employer]   Approval received for patient use of Ubrelvy   Approval granted: 2/10/25-3/12/26        Pt notified

## 2025-03-14 ENCOUNTER — OFFICE VISIT (OUTPATIENT)
Dept: FAMILY MEDICINE CLINIC | Facility: CLINIC | Age: 20
End: 2025-03-14
Payer: COMMERCIAL

## 2025-03-14 VITALS
RESPIRATION RATE: 18 BRPM | TEMPERATURE: 98 F | WEIGHT: 171.63 LBS | HEIGHT: 66 IN | SYSTOLIC BLOOD PRESSURE: 105 MMHG | HEART RATE: 103 BPM | BODY MASS INDEX: 27.58 KG/M2 | DIASTOLIC BLOOD PRESSURE: 55 MMHG | OXYGEN SATURATION: 98 %

## 2025-03-14 DIAGNOSIS — R79.89 ELEVATED TSH: ICD-10-CM

## 2025-03-14 DIAGNOSIS — N92.6 IRREGULAR MENSTRUAL BLEEDING: Primary | ICD-10-CM

## 2025-03-14 LAB
T4 FREE SERPL-MCNC: 1.1 NG/DL (ref 0.8–1.7)
TSI SER-ACNC: 4.01 UIU/ML (ref 0.55–4.78)

## 2025-03-14 PROCEDURE — 84439 ASSAY OF FREE THYROXINE: CPT | Performed by: NURSE PRACTITIONER

## 2025-03-14 PROCEDURE — 3074F SYST BP LT 130 MM HG: CPT | Performed by: NURSE PRACTITIONER

## 2025-03-14 PROCEDURE — 84443 ASSAY THYROID STIM HORMONE: CPT | Performed by: NURSE PRACTITIONER

## 2025-03-14 PROCEDURE — 3008F BODY MASS INDEX DOCD: CPT | Performed by: NURSE PRACTITIONER

## 2025-03-14 PROCEDURE — 99214 OFFICE O/P EST MOD 30 MIN: CPT | Performed by: NURSE PRACTITIONER

## 2025-03-14 PROCEDURE — 3078F DIAST BP <80 MM HG: CPT | Performed by: NURSE PRACTITIONER

## 2025-03-14 NOTE — PROGRESS NOTES
CHIEF COMPLAINT:    Chief Complaint   Patient presents with    Irregular Periods     Irregular periods for the past 2 months       HISTORY OF PRESENT ILLNESS:    Kelsie presents today, March 14, 2025, for one new health concern.    Irregular periods and left lower quadrant pain  Began about 2 months ago  Menstrual cycle 1 month ago, brown blood x 10 days  Menstrual cycle returned this month, day 5 of brown blood/spotting, which is a change in menstrual bleeding compared to previous cycles  Positive for pain to left pelvis, described as cramping and pressure  Denies vaginal discharge, concern for STI, constipation, diarrhea, or flank pain  Declines STI screening today      ALLERGIES:  Allergies[1]    CURRENT MEDICATIONS:  Current Outpatient Medications   Medication Sig Dispense Refill    ubrogepant (UBRELVY) 100 MG Oral Tab Take one tablet at onset of migraine.  May take additional tablet in 2 hours if needed.  Do not exceed two tablets per 24 hour period. 3 tablet 0    metoclopramide 10 MG Oral Tab 1 tab with the first dose of migraine medication 20 tablet 0    Levonorgestrel-Ethinyl Estrad 0.1-20 MG-MCG Oral Tab Take 1 tablet by mouth daily. Due for physical before next refill 84 tablet 3    sertraline (ZOLOFT) 50 MG Oral Tab Take 1 tablet (50 mg total) by mouth daily. 90 tablet 3       MEDICAL HISTORY:  Past Medical History:    Anxiety    Arrhythmia    SVT - had ablation 2010    Calcaneal apophysitis    Hypothyroidism    SVT (supraventricular tachycardia) (HCC)     Past Surgical History:   Procedure Laterality Date    Ablation      Other surgical history  8/18/10    ablation for SVT     Family History   Problem Relation Age of Onset    Migraines Father     Other (Positive for Brca 2) Mother     Anemia Mother     Cancer Mother         Skin Cancer    Cancer Paternal Grandfather     Cancer Maternal Grandmother         Breast Cancer    Prostate Cancer Maternal Grandfather     Asthma Brother     Depression Brother      Heart Disease Neg     Stroke Neg      Family Status   Relation Status    Fa Alive    Mo Alive    PGMA Alive    PGFA Alive    MGMA Alive    MGFA Alive    Bro (Not Specified)    Bro (Not Specified)    NEG (Not Specified)     Social History     Socioeconomic History    Marital status: Single   Tobacco Use    Smoking status: Never     Passive exposure: Never    Smokeless tobacco: Never   Vaping Use    Vaping status: Never Used   Substance and Sexual Activity    Alcohol use: No    Drug use: No   Other Topics Concern    Caffeine Concern No    Stress Concern Yes    Weight Concern No    Special Diet No    Exercise No    Seat Belt No       ROS:  GENERAL:  +HPI  RESPIRATORY:  Denies difficulty breathing  CARDIAC:  Denies chest pain with exertion    VITALS:   /55   Pulse 103   Temp 98 °F (36.7 °C) (Temporal)   Resp 18   Ht 5' 6\" (1.676 m)   Wt 171 lb 10.1 oz (77.9 kg)   LMP 03/10/2025 (Approximate)   SpO2 98%   BMI 27.70 kg/m²     Reviewed by Luciana Cervantes MS, APRN, FNP-BC    PHYSICAL EXAM:    Physical Exam  Constitutional:       General: She is not in acute distress.     Appearance: Normal appearance.   HENT:      Head: Normocephalic and atraumatic.   Cardiovascular:      Rate and Rhythm: Tachycardia present.   Pulmonary:      Effort: Pulmonary effort is normal.   Abdominal:      General: Abdomen is flat. Bowel sounds are normal. There is no distension.      Palpations: Abdomen is soft. There is no mass.      Tenderness: There is no abdominal tenderness. There is no guarding.   Musculoskeletal:      Cervical back: Neck supple.   Skin:     General: Skin is warm and dry.   Neurological:      General: No focal deficit present.      Mental Status: She is alert and oriented to person, place, and time.   Psychiatric:         Mood and Affect: Mood normal.         Behavior: Behavior normal.         Thought Content: Thought content normal.         Judgment: Judgment normal.       ASSESSMENT & PLAN:    1. Irregular  menstrual bleeding  - TSH and Free T4 [E]; Future  - VENIPUNCTURE  - TSH and Free T4 [E]    2. Elevated TSH  - TSH and Free T4 [E]; Future  - VENIPUNCTURE  - TSH and Free T4 [E]    Complete thyroid labs  If abnormal, considering TPO and thyroid US to assess for thyroid nodules/autoimmune  If normal, considering abd/pelv US to assess for ovarian cysts       [1] No Known Allergies

## 2025-05-11 DIAGNOSIS — F41.9 ANXIETY: ICD-10-CM

## 2025-05-12 NOTE — TELEPHONE ENCOUNTER
Requested Renewals       sertraline (ZOLOFT) 50 MG Oral Tab         Sig: Take 1 tablet (50 mg total) by mouth daily.    Disp: 90 tablet    Refills: 3    Start: 5/11/2025    Class: Normal    Non-formulary For: Anxiety    Last ordered: 1 year ago (12/26/2023) by GERARDO Solares    Psychiatric Non-Scheduled (Anti-Anxiety) [id number]/11/2025 06:15 PM   Protocol Details In person appointment or virtual visit in the past 6 mos or appointment in next 3 mos    Depression Screening completed within the past 12 months    Medication is active on med list      To be filled at: [id number] - [place], [place] - [address] [phone], [phone]

## 2025-06-18 ENCOUNTER — OFFICE VISIT (OUTPATIENT)
Dept: OBGYN CLINIC | Facility: CLINIC | Age: 20
End: 2025-06-18
Payer: COMMERCIAL

## 2025-06-18 VITALS
HEIGHT: 66 IN | SYSTOLIC BLOOD PRESSURE: 112 MMHG | HEART RATE: 122 BPM | WEIGHT: 174.5 LBS | BODY MASS INDEX: 28.04 KG/M2 | DIASTOLIC BLOOD PRESSURE: 66 MMHG

## 2025-06-18 DIAGNOSIS — Z80.3 FAMILY HISTORY OF BREAST CANCER IN MOTHER: ICD-10-CM

## 2025-06-18 DIAGNOSIS — Z01.419 WELL WOMAN EXAM WITH ROUTINE GYNECOLOGICAL EXAM: Primary | ICD-10-CM

## 2025-06-18 DIAGNOSIS — Z12.4 SCREENING FOR CERVICAL CANCER: ICD-10-CM

## 2025-06-18 PROCEDURE — 99395 PREV VISIT EST AGE 18-39: CPT | Performed by: NURSE PRACTITIONER

## 2025-06-18 PROCEDURE — 3008F BODY MASS INDEX DOCD: CPT | Performed by: NURSE PRACTITIONER

## 2025-06-18 PROCEDURE — 99459 PELVIC EXAMINATION: CPT | Performed by: NURSE PRACTITIONER

## 2025-06-18 PROCEDURE — 3074F SYST BP LT 130 MM HG: CPT | Performed by: NURSE PRACTITIONER

## 2025-06-18 PROCEDURE — 3078F DIAST BP <80 MM HG: CPT | Performed by: NURSE PRACTITIONER

## 2025-06-18 NOTE — PROGRESS NOTES
Subjective:  Chief Complaint   Patient presents with    Annual     20 year old female  presents for annual.    Denies current complaints    Patient's last menstrual period was 2025 (approximate).  Pap Result Notes: no pap yet  Menarche: 12 (2025  4:57 PM)  Period Cycle (Days): irregular past 3 months (2025  4:57 PM)  Period Duration (Days): 5 days (2025  4:57 PM)  Period Flow: moderate to heavy (2025  4:57 PM)  Use of Birth Control (if yes, specify type): None (2025  4:57 PM)  Pap Result Notes: no pap yet (2025  4:57 PM)    Sexually active male partner  Pelvic Infections/STD: Declines STD screen  Contraception: condoms    Denies family history of colon CA.  MGM with breast and ovarian CA  Mother of pt completed genetics, positive - pt not sure which gene    Feeling safe at home.    Most Recent Immunizations   Administered Date(s) Administered    >= 3 Yrs, FLUZONE, Pres Free (78348), Influenza Vaccine, Flu Clinic 2014    >=3 YRS QUAD MULTIDOSE VIAL (41662) FLU CLINIC 10/22/2014    Covid-19 Vaccine Pfizer 30 mcg/0.3 ml 2021    DTAP 2008    FLU VAC QIV SPLIT 3 YRS AND OLDER (77308) 10/18/2018    FLUZONE 6 months and older PFS 0.5 ml (81126) 2023    HEP A 2013    HEP B/HIB 10/24/2005    Hpv Virus Vaccine 9 Nora Im 2017    IPV 2008    Influenza 10/26/2022    Influenza Vaccine, trivalent (IIV3), 0.5mL IM 2009    MMR 2009    Meningococcal-Menactra 2020    Pneumococcal (Prevnar 7) 2005    Rocephin Per 250mg, Im Inj 2009    TDAP 2015    Varicella 2009      reports that she has never smoked. She has never been exposed to tobacco smoke. She has never used smokeless tobacco.   reports no history of alcohol use.    Past Medical History[1]  Past Surgical History[2]    Review of Systems:  Pertinent items are noted in the HPI.    Objective:  /66   Pulse (!) 122   Ht 66\"   Wt 174 lb 8 oz (79.2 kg)    LMP 06/04/2025 (Approximate)   BMI 28.17 kg/m²    Physical Examination:  General appearance: Well dressed, well nourished in no apparent distress  Neurologic/Psychiatric: Alert and oriented to person, place and time, mood normal, affect appropriate  Head: Normocephalic without obvious deformity, atraumatic  Neck: No thyromegaly, supple, non-tender, no masses, no adenopathy  Lungs: Clear to auscultation bilaterally, no rales, wheezes or rhonchi  Breasts: Symmetric, non-tender, no masses, lesions, retraction, dimpling or discharge bilaterally, no axillary or supraclavicular lymphadenopathy  Heart: Regular rate and rhythm, no gallops or murmurs  Abdomen: Soft, non-tender, non-distended, no masses, no hepatosplenomegaly, no hernias, no inguinal lymphadenopathy  Pelvic:    External genitalia- Normal, Bartholin's, urethra, skeins glands normal   Vagina- No vaginal lesions, physiologic discharge   Cervix- No lesions, long/closed, no cervical motion tenderness   Uterus- Normal sized, non-tender, no masses   Adnexa-  Non-tender, no masses  Extremities: Non-tender, full range of motion, no clubbing, cyanosis or edema  Skin:  General inspection- no rashes, lesions or discoloration  Pap smear done    Assessment/Plan:  Normal well-woman exam.  Declines STD screen  Pap smear obtained.    Patient offered chaperone for exam, declined    Diagnoses and all orders for this visit:    Well woman exam with routine gynecological exam  - self breast exam discussed and encouraged    Screening for cervical cancer  -     ThinPrep PAP with HPV Reflex Request B; Future    Family history of breast cancer in mother  -     OP REFERRAL TO GENETIC COUNSELOR        Return in about 1 year (around 6/18/2026) for annual well woman exam or sooner if needed.             [1]   Past Medical History:   Anxiety    Arrhythmia    SVT - had ablation 2010    Calcaneal apophysitis    Hypothyroidism    SVT (supraventricular tachycardia) (HCC)   [2]   Past  Surgical History:  Procedure Laterality Date    Ablation      Other surgical history  8/18/10    ablation for SVT

## 2025-06-24 ENCOUNTER — OFFICE VISIT (OUTPATIENT)
Dept: FAMILY MEDICINE CLINIC | Facility: CLINIC | Age: 20
End: 2025-06-24
Payer: COMMERCIAL

## 2025-06-24 VITALS
OXYGEN SATURATION: 98 % | DIASTOLIC BLOOD PRESSURE: 72 MMHG | BODY MASS INDEX: 26.68 KG/M2 | TEMPERATURE: 103 F | WEIGHT: 170 LBS | HEIGHT: 67 IN | HEART RATE: 131 BPM | SYSTOLIC BLOOD PRESSURE: 112 MMHG | RESPIRATION RATE: 18 BRPM

## 2025-06-24 DIAGNOSIS — J02.0 STREP PHARYNGITIS: Primary | ICD-10-CM

## 2025-06-24 DIAGNOSIS — J02.9 SORE THROAT: ICD-10-CM

## 2025-06-24 LAB
CONTROL LINE PRESENT WITH A CLEAR BACKGROUND (YES/NO): YES YES/NO
KIT LOT #: NORMAL NUMERIC
STREP GRP A CUL-SCR: POSITIVE

## 2025-06-24 PROCEDURE — 99213 OFFICE O/P EST LOW 20 MIN: CPT | Performed by: FAMILY MEDICINE

## 2025-06-24 PROCEDURE — 87880 STREP A ASSAY W/OPTIC: CPT | Performed by: FAMILY MEDICINE

## 2025-06-24 PROCEDURE — 3078F DIAST BP <80 MM HG: CPT | Performed by: FAMILY MEDICINE

## 2025-06-24 PROCEDURE — 3074F SYST BP LT 130 MM HG: CPT | Performed by: FAMILY MEDICINE

## 2025-06-24 PROCEDURE — 3008F BODY MASS INDEX DOCD: CPT | Performed by: FAMILY MEDICINE

## 2025-06-24 RX ORDER — AMOXICILLIN 500 MG/1
500 CAPSULE ORAL 2 TIMES DAILY
Qty: 20 CAPSULE | Refills: 0 | Status: SHIPPED | OUTPATIENT
Start: 2025-06-24 | End: 2025-07-04

## 2025-06-24 NOTE — PROGRESS NOTES
Kelsie Watson is a 20 year old female.    S:  Patient presents today with the following concerns:  Chief Complaint   Patient presents with    Sore Throat     Sore throat, body aches, and 102.2 fever. Symptoms started on Sunday   OTC: Ibuprofen   Neg at home covid test ( last night )  NO exposure    Started 2 days ago.     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      Current Medications[1]  Problem List[2]  Family History[3]    REVIEW OF SYSTEMS:  GENERAL: feels unwell  SKIN: denies any unusual skin lesions  EYES:denies vision change  LUNGS: denies shortness of breath with exertion  CARDIOVASCULAR: denies chest pain on exertion  GI: denies abdominal pain.  No N/V/D/C  : denies dysuria  MUSCULOSKELETAL: denies back pain  NEURO:  headaches    EXAM:  /72   Pulse (!) 131   Temp (!) 102.9 °F (39.4 °C) (Oral)   Resp 18   Ht 5' 7\" (1.702 m)   Wt 170 lb (77.1 kg)   LMP 06/04/2025 (Approximate)   SpO2 98%   BMI 26.63 kg/m²   Physical Exam  Constitutional:       General: She is not in acute distress.     Appearance: Normal appearance. She is not ill-appearing, toxic-appearing or diaphoretic.   HENT:      Head: Normocephalic and atraumatic.      Right Ear: Tympanic membrane, ear canal and external ear normal.      Left Ear: Tympanic membrane, ear canal and external ear normal.      Mouth/Throat:      Mouth: Mucous membranes are moist.      Pharynx: Posterior oropharyngeal erythema present. No oropharyngeal exudate.   Eyes:      Extraocular Movements: Extraocular movements intact.      Conjunctiva/sclera: Conjunctivae normal.      Pupils: Pupils are equal, round, and reactive to light.   Cardiovascular:      Rate and Rhythm: Normal rate and regular rhythm.   Pulmonary:      Effort: Pulmonary effort is normal.      Breath sounds: Normal breath sounds.   Musculoskeletal:      Cervical back: Neck supple. Tenderness present. No rigidity.   Lymphadenopathy:      Cervical: Cervical adenopathy present.   Skin:      General: Skin is warm and dry.   Neurological:      General: No focal deficit present.      Mental Status: She is alert and oriented to person, place, and time.   Psychiatric:         Mood and Affect: Mood normal.         Behavior: Behavior normal.      Rapid Strep test is Positive.    ASSESSMENT AND PLAN:  Kelsie Watson is a 20 year old female.  Encounter Diagnoses   Name Primary?    Strep pharyngitis Yes    Sore throat        No results found.     Orders Placed This Encounter   Procedures    Strep A Assay W/Optic     Meds & Refills for this Visit:  Requested Prescriptions     Signed Prescriptions Disp Refills    amoxicillin 500 MG Oral Cap 20 capsule 0     Sig: Take 1 capsule (500 mg total) by mouth 2 (two) times daily for 10 days.     Imaging & Consults:  None    No follow-ups on file.     Amoxicillin as above.  Contagious for 24 hours after starting the antibiotic.    Change out toothbrush in 2-3 days.  Tylenol or ibuprofen prn pain, fevers.  May take both at the same time.  Fluids, rest.  Follow up if symptoms change, worsen, do not improve.    Patient verbalizes understanding of plan.         [1]   Current Outpatient Medications   Medication Sig Dispense Refill    amoxicillin 500 MG Oral Cap Take 1 capsule (500 mg total) by mouth 2 (two) times daily for 10 days. 20 capsule 0    sertraline (ZOLOFT) 50 MG Oral Tab Take 1 tablet (50 mg total) by mouth daily. 90 tablet 3    ubrogepant (UBRELVY) 100 MG Oral Tab Take one tablet at onset of migraine.  May take additional tablet in 2 hours if needed.  Do not exceed two tablets per 24 hour period. 3 tablet 0    metoclopramide 10 MG Oral Tab 1 tab with the first dose of migraine medication 20 tablet 0    Levonorgestrel-Ethinyl Estrad 0.1-20 MG-MCG Oral Tab Take 1 tablet by mouth daily. Due for physical before next refill 84 tablet 3   [2]   Patient Active Problem List  Diagnosis    SVT (supraventricular tachycardia) (HCC)    Achilles tendinitis of both lower  extremities    Anxiety    Migraine without aura and without status migrainosus, not intractable    Chronic midline low back pain without sciatica   [3]   Family History  Problem Relation Age of Onset    Migraines Father     Other (Positive for Brca 2) Mother     Anemia Mother     Cancer Mother         Skin Cancer    Cancer Paternal Grandfather     Cancer Maternal Grandmother         Breast Cancer    Prostate Cancer Maternal Grandfather     Asthma Brother     Depression Brother     Heart Disease Neg     Stroke Neg

## 2025-08-27 DIAGNOSIS — F41.9 ANXIETY: ICD-10-CM

## 2025-08-29 ENCOUNTER — OFFICE VISIT (OUTPATIENT)
Dept: FAMILY MEDICINE CLINIC | Facility: CLINIC | Age: 20
End: 2025-08-29

## 2025-08-29 VITALS
HEART RATE: 95 BPM | DIASTOLIC BLOOD PRESSURE: 68 MMHG | TEMPERATURE: 98 F | WEIGHT: 177 LBS | RESPIRATION RATE: 18 BRPM | OXYGEN SATURATION: 98 % | BODY MASS INDEX: 28 KG/M2 | SYSTOLIC BLOOD PRESSURE: 100 MMHG

## 2025-08-29 DIAGNOSIS — B37.31 VAGINAL YEAST INFECTION: ICD-10-CM

## 2025-08-29 DIAGNOSIS — N89.8 VAGINAL DISCHARGE: Primary | ICD-10-CM

## 2025-08-29 PROCEDURE — 81514 NFCT DS BV&VAGINITIS DNA ALG: CPT

## 2025-08-29 PROCEDURE — 99213 OFFICE O/P EST LOW 20 MIN: CPT

## 2025-08-29 PROCEDURE — 3074F SYST BP LT 130 MM HG: CPT

## 2025-08-29 PROCEDURE — 3078F DIAST BP <80 MM HG: CPT

## 2025-08-29 RX ORDER — FLUCONAZOLE 200 MG/1
TABLET ORAL
Qty: 3 TABLET | Refills: 0 | Status: SHIPPED | OUTPATIENT
Start: 2025-08-29

## 2025-08-30 LAB
BV BACTERIA DNA VAG QL NAA+PROBE: NEGATIVE
C GLABRATA DNA VAG QL NAA+PROBE: NEGATIVE
C KRUSEI DNA VAG QL NAA+PROBE: NEGATIVE
CANDIDA DNA VAG QL NAA+PROBE: POSITIVE
T VAGINALIS DNA VAG QL NAA+PROBE: NEGATIVE